# Patient Record
Sex: MALE
[De-identification: names, ages, dates, MRNs, and addresses within clinical notes are randomized per-mention and may not be internally consistent; named-entity substitution may affect disease eponyms.]

---

## 2023-01-26 ENCOUNTER — NURSE TRIAGE (OUTPATIENT)
Dept: OTHER | Facility: CLINIC | Age: 43
End: 2023-01-26

## 2023-01-26 NOTE — TELEPHONE ENCOUNTER
Location of [place]: [place]    Current Symptoms: Pt states that he surgery on his prostate 2 weeks ago. The discharge paperwork told him to [facility] your prostate. \" Pt would like to know what this means. He has been unable to reach his surgeon. Recommended disposition: Contact surgeon. Care advice provided, patient verbalizes understanding; denies any other questions or concerns; instructed to call back for any new or worsening symptoms. This triage is a result of a call to [address] [place] [address]. Please do not respond to the triage nurse through this encounter. Any subsequent communication should be directly with the patient.     Reason for Disposition   Caller has NON-URGENT question and triager unable to answer question    Protocols used: Post-Op Symptoms and Questions-ADULT-[place]